# Patient Record
Sex: FEMALE | Race: BLACK OR AFRICAN AMERICAN | NOT HISPANIC OR LATINO | Employment: UNEMPLOYED | URBAN - METROPOLITAN AREA
[De-identification: names, ages, dates, MRNs, and addresses within clinical notes are randomized per-mention and may not be internally consistent; named-entity substitution may affect disease eponyms.]

---

## 2017-09-27 ENCOUNTER — HOSPITAL ENCOUNTER (EMERGENCY)
Facility: HOSPITAL | Age: 37
Discharge: HOME | End: 2017-09-27
Attending: EMERGENCY MEDICINE
Payer: MEDICAID

## 2017-09-27 VITALS
OXYGEN SATURATION: 100 % | HEIGHT: 62 IN | TEMPERATURE: 98 F | BODY MASS INDEX: 27.79 KG/M2 | SYSTOLIC BLOOD PRESSURE: 138 MMHG | WEIGHT: 151 LBS | RESPIRATION RATE: 18 BRPM | HEART RATE: 87 BPM | DIASTOLIC BLOOD PRESSURE: 86 MMHG

## 2017-09-27 DIAGNOSIS — Z76.89 ENCOUNTER FOR PSYCHIATRIC ASSESSMENT: Primary | ICD-10-CM

## 2017-09-27 DIAGNOSIS — K43.9 ABDOMINAL WALL HERNIA: ICD-10-CM

## 2017-09-27 DIAGNOSIS — F43.9 STRESS AT HOME: ICD-10-CM

## 2017-09-27 PROCEDURE — 99283 EMERGENCY DEPT VISIT LOW MDM: CPT

## 2017-09-27 NOTE — ED PROVIDER NOTES
"SCRIBE #1 NOTE: I, Boy Jacquelin, am scribing for, and in the presence of, Sari Alexandre MD. I have scribed the entire note.      History      Chief Complaint   Patient presents with    opc     pt presents to the ER on OPC written by her sister in law, she states they got in a fam argument yesterday and she thinks they have done this to get back at her, pt reports that she take percocets that are her prescription and that she denies any drug abuse or SI/HI       Review of patient's allergies indicates:  No Known Allergies     HPI   HPI    9/27/2017, 1:32 PM   History obtained from the patient      History of Present Illness: Barbie Christianson is a 37 y.o. female patient who presents to the Emergency Department for OPC evaluaton. Pt presents to the ED under OPC via pt's sister in law. OPC states pt is doing street drugs and she is not bathing. OPC states pt is talking to herself and she is threatening bodily harm to family members. Pt states this is not true and they have a lot of family issues and family conflicts going on right now. Pt states she got into an argument with her aunt and sister in law and her sister in law is looking to cause more issues "because her  got another woman pregnant." Pt reports her sister in law has tried to get child protective services on her sister's kids in the past. Pt reports having a lot of stress due to family matters. Pt states she does take pain medication for her hernia to her abdominal wall which she plans to have surgery on. Pt states she has an appointment next week to f/u with her general surgeon. Pt denies any SI, HI, IV drug use, ETOH abuse, auditory/visual hallucinations, sleep changes, dysphoric mood, behavior problem, confusion, nervous/anxious and all other sxs at this time. No further complaints or concerns at this time.    Arrival mode: Personal vehicle     PCP: No primary care provider on file.       Past Medical History:  No past medical history on " file.    Past Surgical History:  No past surgical history on file.      Family History:  No family history on file.    Social History:  Social History     Social History Main Topics    Smoking status: Not on file    Smokeless tobacco: Not on file    Alcohol use Not on file    Drug use: Unknown    Sexual activity: Not on file       ROS   Review of Systems   Constitutional: Negative for chills and fever.   HENT: Negative for congestion and sore throat.    Respiratory: Negative for chest tightness and shortness of breath.    Cardiovascular: Negative for chest pain.   Gastrointestinal: Negative for abdominal pain, nausea and vomiting.   Musculoskeletal: Negative for back pain and neck pain.   Skin: Negative for rash.   Neurological: Negative for dizziness, numbness and headaches.   Psychiatric/Behavioral: Negative for agitation, behavioral problems, confusion, decreased concentration, dysphoric mood, hallucinations, self-injury, sleep disturbance and suicidal ideas. The patient is not nervous/anxious and is not hyperactive.         (+)stress   All other systems reviewed and are negative.      Physical Exam      Initial Vitals [09/27/17 1323]   BP Pulse Resp Temp SpO2   138/86 87 18 98 °F (36.7 °C) 100 %      MAP       103.33          Physical Exam  Nursing Notes and Vital Signs Reviewed.  Constitutional: Patient is in no apparent distress. Well-developed and well-nourished.  Head: Atraumatic. Normocephalic.  Eyes: PERRL. EOM intact. Conjunctivae are not pale. No scleral icterus.  ENT: Mucous membranes are moist.    Neck: Supple. Full ROM.  Cardiovascular: Regular rate. Regular rhythm. No murmurs, rubs, or gallops. Distal pulses are 2+ and symmetric.  Pulmonary/Chest: No respiratory distress. Clear to auscultation bilaterally. No wheezing, rales, or rhonchi.  Abdominal: Soft and non-distended.  There is no tenderness.  Easily reducible abdominal wall hernia noted.  Musculoskeletal: Moves all extremities. No obvious  "deformities. No edema.  Skin: Warm and dry.  Neurological:  Alert, awake, and appropriate.  Normal speech.  No acute focal neurological deficits are appreciated.  Psychiatric:               Behavior: normal, cooperative, eye contact normal              Mood and Affect: normal affect              Thought Process: goal directed and within normal limits              Suicidal Ideations: No              Suicidal Plan: No specific plan to harm self              Homicidal Ideations: No              Hallucinations: none      ED Course    Procedures  ED Vital Signs:  Vitals:    09/27/17 1323   BP: 138/86   Pulse: 87   Resp: 18   Temp: 98 °F (36.7 °C)   TempSrc: Oral   SpO2: 100%   Weight: 68.5 kg (151 lb)   Height: 5' 2" (1.575 m)       Abnormal Lab Results:  Labs Reviewed - No data to display     All Lab Results:  None    Imaging Results:  Imaging Results    None                 The Emergency Provider reviewed the vital signs and test results, which are outlined above.    ED Discussion     Patient does not meet PEC Criteria, within her right frame of mind, no SI, HI or hallucinations.     1:36 PM: Reassessed pt at this time. Pt is sitting up comfortably in ED bed and in NAD. Pt is awake, alert, and oriented. Discussed with pt all pertinent ED information and results. Discussed pt dx and plan of tx. Gave pt all f/u and return to the ED instructions. All questions and concerns were addressed at this time. Pt expresses understanding of information and instructions, and is comfortable with plan to discharge. Pt is stable for discharge.        ED Medication(s):  Medications - No data to display    New Prescriptions    No medications on file       Follow-up Information     [facility].                   Medical Decision Making              Scribe Attestation:   Scribe #1: I performed the above scribed service and the documentation accurately describes the services I performed. I attest to the " accuracy of the note.    Attending:   Physician Attestation Statement for Scribe #1: I, Sari Alexandre MD, personally performed the services described in this documentation, as scribed by Boy Roper, in my presence, and it is both accurate and complete.          Clinical Impression       ICD-10-CM ICD-9-CM   1. Encounter for psychiatric assessment Z76.89 V49.89   2. Stress at home F43.9 V61.9   3. Abdominal wall hernia K43.9 553.20       Disposition:   Disposition: Discharged  Condition: Stable         Sari Alexandre MD  09/27/17 0316

## 2017-09-27 NOTE — ED NOTES
Patient presented to ED on an OPC. OPC stated that patient is doing street drugs and not taking of herself (not bathing)  Patient admitted to getting into an argument with family members. Patient currently A/O x denies any  SI/HI, 0=Del,  0=Gan, Speech=normal tone and rate, Mood/Behavioral=calm /cooperative, Thought content=rational/reasonable, Affect=appropriate. No distress noted.  SW discussed with ED MD. ED MD and SW are in agreement that patient does not meet the criteria for being place on a PEC.  Patient provided patient with local mental health/substance abuse resources.